# Patient Record
Sex: FEMALE | Race: BLACK OR AFRICAN AMERICAN | NOT HISPANIC OR LATINO | Employment: FULL TIME | URBAN - METROPOLITAN AREA
[De-identification: names, ages, dates, MRNs, and addresses within clinical notes are randomized per-mention and may not be internally consistent; named-entity substitution may affect disease eponyms.]

---

## 2021-02-25 PROBLEM — M51.26 LUMBAR DISC HERNIATION: Status: ACTIVE | Noted: 2017-05-01

## 2023-03-24 PROBLEM — Z12.11 COLON CANCER SCREENING: Status: ACTIVE | Noted: 2023-03-24

## 2023-12-12 PROBLEM — M54.2 CERVICALGIA: Status: ACTIVE | Noted: 2023-12-12

## 2024-09-18 PROBLEM — Z98.1 S/P CERVICAL SPINAL FUSION: Status: ACTIVE | Noted: 2024-09-18

## 2024-09-27 DIAGNOSIS — Z12.31 OTHER SCREENING MAMMOGRAM: ICD-10-CM

## 2024-09-30 ENCOUNTER — PATIENT MESSAGE (OUTPATIENT)
Dept: ADMINISTRATIVE | Facility: HOSPITAL | Age: 49
End: 2024-09-30

## 2025-03-18 ENCOUNTER — PATIENT MESSAGE (OUTPATIENT)
Dept: ADMINISTRATIVE | Facility: HOSPITAL | Age: 50
End: 2025-03-18

## 2025-05-13 ENCOUNTER — PATIENT OUTREACH (OUTPATIENT)
Dept: ADMINISTRATIVE | Facility: HOSPITAL | Age: 50
End: 2025-05-13

## 2025-05-13 PROBLEM — N95.1 HOT FLASHES DUE TO MENOPAUSE: Status: ACTIVE | Noted: 2025-05-13

## 2025-07-17 ENCOUNTER — OCHSNER VIRTUAL EMERGENCY DEPARTMENT (OUTPATIENT)
Facility: CLINIC | Age: 50
End: 2025-07-17

## 2025-07-17 ENCOUNTER — NURSE TRIAGE (OUTPATIENT)
Dept: ADMINISTRATIVE | Facility: CLINIC | Age: 50
End: 2025-07-17

## 2025-07-17 ENCOUNTER — PATIENT OUTREACH (OUTPATIENT)
Facility: OTHER | Age: 50
End: 2025-07-17

## 2025-07-17 NOTE — PROGRESS NOTES
"Patient spoke with [facility] RN on 7/17/25 with c/o "having a pus pocket near left eye. Now left eye is red and draining since yesterday. Also c/o burning to left eye. States that vision is little blurry, but still able to see."  RN consulted with [facility] provider, Dr. Cory Weiss, and the disposition recommendation was Urgent Care.    Follow up scheduled 7/18/25 to ensure patient's healthcare needs were addressed and to assist with any additional needs or concerns.    Susy Howell  ED Navigator    "

## 2025-07-17 NOTE — TELEPHONE ENCOUNTER
Pt c/o having a pus pocket near left eye. Now left eye is red and draining since yesterday. Also c/o burning to left eye. States that vision is little blurry, but still able to see. Advise to go to office now of VV. Pt requesting a VV. Unable to schedule specialty appts. Secure chat sent to [facility] Dr. Cory Weiss per protocol. Dr. Weiss advised UC per protocol. Pt made aware. VU. Encounter routed to provider.   Reason for Disposition   Blurred vision    Additional Information   Negative: SEVERE pain (e.g., excruciating)   Negative: Patient sounds very sick or weak to the triager   Negative: MODERATE eye pain (e.g., interferes with normal activities)   Negative: Cloudy spot or sore seen on the cornea (clear part of the eye)    Protocols used: Eye - Pus or [id number]

## 2025-07-17 NOTE — PLAN OF CARE-OVED
[facility] Emergency Department Plan of Care Note  Referral Source: Nurse On-Call                               Chief Complaint   Patient presents with    Eye Drainage     Pt c/o having a pus pocket near left eye. Now left eye is red and draining since yesterday. Also c/o burning to left eye. States that vision is little blurry, but still able to see.       Recommendation: Urgent Care